# Patient Record
Sex: FEMALE | Race: BLACK OR AFRICAN AMERICAN | NOT HISPANIC OR LATINO
[De-identification: names, ages, dates, MRNs, and addresses within clinical notes are randomized per-mention and may not be internally consistent; named-entity substitution may affect disease eponyms.]

---

## 2021-10-25 PROBLEM — Z00.00 ENCOUNTER FOR PREVENTIVE HEALTH EXAMINATION: Status: ACTIVE | Noted: 2021-10-25

## 2021-11-08 ENCOUNTER — APPOINTMENT (OUTPATIENT)
Dept: OBGYN | Facility: CLINIC | Age: 36
End: 2021-11-08
Payer: COMMERCIAL

## 2021-11-08 VITALS
HEIGHT: 62 IN | DIASTOLIC BLOOD PRESSURE: 70 MMHG | SYSTOLIC BLOOD PRESSURE: 120 MMHG | BODY MASS INDEX: 42.33 KG/M2 | WEIGHT: 230 LBS

## 2021-11-08 DIAGNOSIS — E28.2 POLYCYSTIC OVARIAN SYNDROME: ICD-10-CM

## 2021-11-08 DIAGNOSIS — D25.9 LEIOMYOMA OF UTERUS, UNSPECIFIED: ICD-10-CM

## 2021-11-08 PROCEDURE — 99203 OFFICE O/P NEW LOW 30 MIN: CPT

## 2021-11-15 ENCOUNTER — TRANSCRIPTION ENCOUNTER (OUTPATIENT)
Age: 36
End: 2021-11-15

## 2021-11-24 ENCOUNTER — TRANSCRIPTION ENCOUNTER (OUTPATIENT)
Age: 36
End: 2021-11-24

## 2021-11-24 PROBLEM — D25.9 FIBROID UTERUS: Status: ACTIVE | Noted: 2021-11-24

## 2021-11-24 LAB
17OHP SERPL-MCNC: 44 NG/DL
DHEA SERPL-MCNC: 233 NG/DL
DHEA-S SERPL-MCNC: 152 UG/DL
ESTIMATED AVERAGE GLUCOSE: 108 MG/DL
HBA1C MFR BLD HPLC: 5.4 %
PROLACTIN SERPL-MCNC: 13.7 NG/ML
TESTOST BND SERPL-MCNC: 2.3 PG/ML
TESTOSTERONE TOTAL S: 22 NG/DL
TSH SERPL-ACNC: 3.66 UIU/ML

## 2021-11-24 NOTE — DISCUSSION/SUMMARY
[FreeTextEntry1] : Octavia is a 36 year old F here today for surgical consultation.\par -Pt will obtain MRI done at [facility].\par -Discussed oocyte preservation\par -PCOS blood work done today\par -Message sent to surgical scheduling for abdominal myomectomy

## 2021-11-24 NOTE — HISTORY OF PRESENT ILLNESS
[N] : Patient does not use contraception [Y] : Patient is sexually active [Menarche Age: ____] : age at menarche was [unfilled] [No] : Patient does not have concerns regarding sex [LMPDate] : 10/26/2021 [MensesFreq] : 5-6 [FreeTextEntry1] : 10/26/2021

## 2021-12-09 ENCOUNTER — APPOINTMENT (OUTPATIENT)
Dept: OBGYN | Facility: CLINIC | Age: 36
End: 2021-12-09
Payer: COMMERCIAL

## 2021-12-09 VITALS
SYSTOLIC BLOOD PRESSURE: 150 MMHG | BODY MASS INDEX: 42.33 KG/M2 | WEIGHT: 230 LBS | HEIGHT: 62 IN | DIASTOLIC BLOOD PRESSURE: 98 MMHG

## 2021-12-09 PROCEDURE — 99213 OFFICE O/P EST LOW 20 MIN: CPT

## 2021-12-15 NOTE — END OF VISIT
[FreeTextEntry3] : I, Tereso Christy, acted solely as a scribe for Dr. Abbey Downey on 12/09/2021.\par \par All medical record entries made by the scribe were at my, Dr. Abbey Downey, direction and personally dictated by me on 12/09/2021. I have reviewed the chart and agree that the record accurately reflects my personal performance of the history, physical exam, assessment and plan. I have also personally directed, reviewed, and agreed with the chart.

## 2021-12-15 NOTE — PLAN
[FreeTextEntry1] : reviewed prior labs sent for pcos\par unable to open mri disk, to upload to PACS system\par will f/u with pt next week

## 2021-12-15 NOTE — HISTORY OF PRESENT ILLNESS
[FreeTextEntry1] : POOL KOEHLER 35 YO LMP 2021  presents for follow up for uterine fibroids, and to review recent MRI scan and blood test results. Patient is doing well. \par \par mri disc to PACS viewer\par \par 139/89 BP on rpt

## 2022-01-18 ENCOUNTER — NON-APPOINTMENT (OUTPATIENT)
Age: 37
End: 2022-01-18

## 2022-01-24 ENCOUNTER — OUTPATIENT (OUTPATIENT)
Dept: OUTPATIENT SERVICES | Facility: HOSPITAL | Age: 37
LOS: 1 days | End: 2022-01-24
Payer: COMMERCIAL

## 2022-01-24 VITALS
WEIGHT: 231.04 LBS | HEIGHT: 62 IN | HEART RATE: 86 BPM | RESPIRATION RATE: 18 BRPM | SYSTOLIC BLOOD PRESSURE: 135 MMHG | OXYGEN SATURATION: 99 % | DIASTOLIC BLOOD PRESSURE: 86 MMHG | TEMPERATURE: 98 F

## 2022-01-24 DIAGNOSIS — Z29.9 ENCOUNTER FOR PROPHYLACTIC MEASURES, UNSPECIFIED: ICD-10-CM

## 2022-01-24 DIAGNOSIS — Z01.818 ENCOUNTER FOR OTHER PREPROCEDURAL EXAMINATION: ICD-10-CM

## 2022-01-24 DIAGNOSIS — Z98.890 OTHER SPECIFIED POSTPROCEDURAL STATES: Chronic | ICD-10-CM

## 2022-01-24 DIAGNOSIS — D25.9 LEIOMYOMA OF UTERUS, UNSPECIFIED: ICD-10-CM

## 2022-01-24 LAB
ANION GAP SERPL CALC-SCNC: 14 MMOL/L — SIGNIFICANT CHANGE UP (ref 5–17)
BLD GP AB SCN SERPL QL: NEGATIVE — SIGNIFICANT CHANGE UP
BUN SERPL-MCNC: 11 MG/DL — SIGNIFICANT CHANGE UP (ref 7–23)
CALCIUM SERPL-MCNC: 9.7 MG/DL — SIGNIFICANT CHANGE UP (ref 8.4–10.5)
CHLORIDE SERPL-SCNC: 99 MMOL/L — SIGNIFICANT CHANGE UP (ref 96–108)
CO2 SERPL-SCNC: 26 MMOL/L — SIGNIFICANT CHANGE UP (ref 22–31)
CREAT SERPL-MCNC: 0.87 MG/DL — SIGNIFICANT CHANGE UP (ref 0.5–1.3)
GLUCOSE SERPL-MCNC: 113 MG/DL — HIGH (ref 70–99)
HCT VFR BLD CALC: 37 % — SIGNIFICANT CHANGE UP (ref 34.5–45)
HGB BLD-MCNC: 11.9 G/DL — SIGNIFICANT CHANGE UP (ref 11.5–15.5)
MCHC RBC-ENTMCNC: 27.8 PG — SIGNIFICANT CHANGE UP (ref 27–34)
MCHC RBC-ENTMCNC: 32.2 GM/DL — SIGNIFICANT CHANGE UP (ref 32–36)
MCV RBC AUTO: 86.4 FL — SIGNIFICANT CHANGE UP (ref 80–100)
NRBC # BLD: 0 /100 WBCS — SIGNIFICANT CHANGE UP (ref 0–0)
PLATELET # BLD AUTO: 362 K/UL — SIGNIFICANT CHANGE UP (ref 150–400)
POTASSIUM SERPL-MCNC: 3.5 MMOL/L — SIGNIFICANT CHANGE UP (ref 3.5–5.3)
POTASSIUM SERPL-SCNC: 3.5 MMOL/L — SIGNIFICANT CHANGE UP (ref 3.5–5.3)
RBC # BLD: 4.28 M/UL — SIGNIFICANT CHANGE UP (ref 3.8–5.2)
RBC # FLD: 16.7 % — HIGH (ref 10.3–14.5)
RH IG SCN BLD-IMP: POSITIVE — SIGNIFICANT CHANGE UP
SODIUM SERPL-SCNC: 139 MMOL/L — SIGNIFICANT CHANGE UP (ref 135–145)
WBC # BLD: 6.05 K/UL — SIGNIFICANT CHANGE UP (ref 3.8–10.5)
WBC # FLD AUTO: 6.05 K/UL — SIGNIFICANT CHANGE UP (ref 3.8–10.5)

## 2022-01-24 PROCEDURE — 85027 COMPLETE CBC AUTOMATED: CPT

## 2022-01-24 PROCEDURE — 80048 BASIC METABOLIC PNL TOTAL CA: CPT

## 2022-01-24 PROCEDURE — 86850 RBC ANTIBODY SCREEN: CPT

## 2022-01-24 PROCEDURE — G0463: CPT

## 2022-01-24 PROCEDURE — 86901 BLOOD TYPING SEROLOGIC RH(D): CPT

## 2022-01-24 PROCEDURE — 86900 BLOOD TYPING SEROLOGIC ABO: CPT

## 2022-01-24 RX ORDER — CEFAZOLIN SODIUM 1 G
2000 VIAL (EA) INJECTION ONCE
Refills: 0 | Status: DISCONTINUED | OUTPATIENT
Start: 2022-02-07 | End: 2022-02-08

## 2022-01-24 NOTE — H&P PST ADULT - NSICDXFAMILYHX_GEN_ALL_CORE_FT
FAMILY HISTORY:  Father  Still living? Yes, Estimated age: Age Unknown  Family hx of hypertension, Age at diagnosis: Age Unknown    Mother  Still living? Yes, Estimated age: Age Unknown  Family history of epilepsy in mother, Age at diagnosis: Age Unknown  FH: type 2 diabetes, Age at diagnosis: Age Unknown

## 2022-01-24 NOTE — H&P PST ADULT - ASSESSMENT
CAPRINI SCORE [CLOT updated 18]    AGE RELATED RISK FACTORS                                                       MOBILITY RELATED FACTORS  [ ] Age 41-60 years                                            (1 Point)                    [ ] Bed rest                                                        (1 Point)  [ ] Age: 61-74 years                                           (2 Points)                  [ ] Plaster cast                                                   (2 Points)  [ ] Age= 75 years                                              (3 Points)                    [ ] Bed bound for more than 72 hours                 (2 Points)    DISEASE RELATED RISK FACTORS                                               GENDER SPECIFIC FACTORS  [ ] Edema in the lower extremities                       (1 Point)              [ ] Pregnancy                                                     (1 Point)  [ ] Varicose veins                                               (1 Point)                     [ ] Post-partum < 6 weeks                                   (1 Point)             [ ] BMI > 25 Kg/m2                                            (1 Point)                     [ ] Hormonal therapy  or oral contraception          (1 Point)                 [ ] Sepsis (in the previous month)                        (1 Point)               [ ] History of pregnancy complications                 (1 point)  [ ] Pneumonia or serious lung disease                                               [ ] Unexplained or recurrent                     (1 Point)           (in the previous month)                               (1 Point)  [ ] Abnormal pulmonary function test                     (1 Point)                 SURGERY RELATED RISK FACTORS  [ ] Acute myocardial infarction                              (1 Point)               [ ]  Section                                             (1 Point)  [ ] Congestive heart failure (in the previous month)  (1 Point)      [ ] Minor surgery                                                  (1 Point)   [ ] Inflammatory bowel disease                             (1 Point)               [ ] Arthroscopic surgery                                        (2 Points)  [ ] Central venous access                                      (2 Points)                [ ] General surgery lasting more than 45 minutes (2 points)  [ ] Present or previous malignancy                     (2 Points)                [ ] Elective arthroplasty                                         (5 points)    [ ] Stroke (in the previous month)                          (5 Points)                                                                                                                                                           HEMATOLOGY RELATED FACTORS                                                 TRAUMA RELATED RISK FACTORS  [ ] Prior episodes of VTE                                     (3 Points)                [ ] Fracture of the hip, pelvis, or leg                       (5 Points)  [ ] Positive family history for VTE                         (3 Points)             [ ] Acute spinal cord injury (in the previous month)  (5 Points)  [ ] Prothrombin 15426 A                                     (3 Points)               [ ] Paralysis  (less than 1 month)                             (5 Points)  [ ] Factor V Leiden                                             (3 Points)                  [ ] Multiple Trauma within 1 month                        (5 Points)  [ ] Lupus anticoagulants                                     (3 Points)                                                           [ ] Anticardiolipin antibodies                               (3 Points)                                                       [ ] High homocysteine in the blood                      (3 Points)                                             [ ] Other congenital or acquired thrombophilia      (3 Points)                                                [ ] Heparin induced thrombocytopenia                  (3 Points)                                     Total Score [ 3  ]

## 2022-01-24 NOTE — H&P PST ADULT - NEUROLOGICAL
[Patient] : patient negative Alert & oriented; no sensory, motor or coordination deficits, normal reflexes

## 2022-01-24 NOTE — H&P PST ADULT - HISTORY OF PRESENT ILLNESS
36yr old female presents to PST w/ c/o menorrhagia and is scheduled for an Abdominal Myomectomy ERP on 2/7/22.  PMH: HTN and Obesity (BMI 42.3). Denies recent fevers, chills, cough, chest pain or SOB and feels well otherwise. COVID testing scheduled at [facility] on 2/4/22.

## 2022-01-24 NOTE — H&P PST ADULT - PROBLEM SELECTOR PLAN 1
Scheduled for sx on 2/7/2022. Surgical and chlorhexidine instructions reviewed w/ pt. COVID testing scheduled at [facility] on 2/4/2022.

## 2022-01-24 NOTE — H&P PST ADULT - NSICDXPASTMEDICALHX_GEN_ALL_CORE_FT
PAST MEDICAL HISTORY:  Hypertension     Leiomyoma of uterus     Severe obesity (BMI >= 40) BMI 42.3

## 2022-01-24 NOTE — H&P PST ADULT - ATTENDING COMMENTS
pt seen and plan discussed. to proceed with abdominal myomectomy. all questions answered. informed consent signed and witnessed.    anuel martínez

## 2022-01-27 PROBLEM — E66.01 MORBID (SEVERE) OBESITY DUE TO EXCESS CALORIES: Chronic | Status: ACTIVE | Noted: 2022-01-24

## 2022-01-27 PROBLEM — I10 ESSENTIAL (PRIMARY) HYPERTENSION: Chronic | Status: ACTIVE | Noted: 2022-01-24

## 2022-01-27 PROBLEM — D25.9 LEIOMYOMA OF UTERUS, UNSPECIFIED: Chronic | Status: ACTIVE | Noted: 2022-01-24

## 2022-02-03 ENCOUNTER — NON-APPOINTMENT (OUTPATIENT)
Age: 37
End: 2022-02-03

## 2022-02-04 ENCOUNTER — OUTPATIENT (OUTPATIENT)
Dept: OUTPATIENT SERVICES | Facility: HOSPITAL | Age: 37
LOS: 1 days | End: 2022-02-04
Payer: COMMERCIAL

## 2022-02-04 DIAGNOSIS — Z98.890 OTHER SPECIFIED POSTPROCEDURAL STATES: Chronic | ICD-10-CM

## 2022-02-04 DIAGNOSIS — Z11.52 ENCOUNTER FOR SCREENING FOR COVID-19: ICD-10-CM

## 2022-02-04 LAB — SARS-COV-2 RNA SPEC QL NAA+PROBE: SIGNIFICANT CHANGE UP

## 2022-02-04 PROCEDURE — U0003: CPT

## 2022-02-04 PROCEDURE — U0005: CPT

## 2022-02-04 PROCEDURE — C9803: CPT

## 2022-02-06 ENCOUNTER — TRANSCRIPTION ENCOUNTER (OUTPATIENT)
Age: 37
End: 2022-02-06

## 2022-02-07 ENCOUNTER — INPATIENT (INPATIENT)
Facility: HOSPITAL | Age: 37
LOS: 0 days | Discharge: HOME | DRG: 742 | End: 2022-02-08
Attending: STUDENT IN AN ORGANIZED HEALTH CARE EDUCATION/TRAINING PROGRAM | Admitting: STUDENT IN AN ORGANIZED HEALTH CARE EDUCATION/TRAINING PROGRAM
Payer: COMMERCIAL

## 2022-02-07 ENCOUNTER — APPOINTMENT (OUTPATIENT)
Dept: OBGYN | Facility: HOSPITAL | Age: 37
End: 2022-02-07

## 2022-02-07 ENCOUNTER — TRANSCRIPTION ENCOUNTER (OUTPATIENT)
Age: 37
End: 2022-02-07

## 2022-02-07 VITALS
SYSTOLIC BLOOD PRESSURE: 139 MMHG | DIASTOLIC BLOOD PRESSURE: 88 MMHG | RESPIRATION RATE: 18 BRPM | OXYGEN SATURATION: 99 % | HEIGHT: 62.01 IN | WEIGHT: 231.04 LBS | TEMPERATURE: 99 F | HEART RATE: 96 BPM

## 2022-02-07 DIAGNOSIS — D25.9 LEIOMYOMA OF UTERUS, UNSPECIFIED: ICD-10-CM

## 2022-02-07 DIAGNOSIS — Z98.890 OTHER SPECIFIED POSTPROCEDURAL STATES: Chronic | ICD-10-CM

## 2022-02-07 LAB
HCG UR QL: NEGATIVE — SIGNIFICANT CHANGE UP
RH IG SCN BLD-IMP: POSITIVE — SIGNIFICANT CHANGE UP

## 2022-02-07 PROCEDURE — 88305 TISSUE EXAM BY PATHOLOGIST: CPT | Mod: 26

## 2022-02-07 PROCEDURE — 58140 MYOMECTOMY ABDOM METHOD: CPT | Mod: 80

## 2022-02-07 PROCEDURE — 58140 MYOMECTOMY ABDOM METHOD: CPT

## 2022-02-07 DEVICE — VISTASEAL FIBRIN HUMAN 4ML: Type: IMPLANTABLE DEVICE | Status: FUNCTIONAL

## 2022-02-07 DEVICE — INTERCEED 5 X 6" XL: Type: IMPLANTABLE DEVICE | Status: FUNCTIONAL

## 2022-02-07 RX ORDER — HYDROMORPHONE HYDROCHLORIDE 2 MG/ML
0.5 INJECTION INTRAMUSCULAR; INTRAVENOUS; SUBCUTANEOUS
Refills: 0 | Status: DISCONTINUED | OUTPATIENT
Start: 2022-02-07 | End: 2022-02-07

## 2022-02-07 RX ORDER — SODIUM CHLORIDE 9 MG/ML
3 INJECTION INTRAMUSCULAR; INTRAVENOUS; SUBCUTANEOUS EVERY 8 HOURS
Refills: 0 | Status: DISCONTINUED | OUTPATIENT
Start: 2022-02-07 | End: 2022-02-07

## 2022-02-07 RX ORDER — ONDANSETRON 8 MG/1
4 TABLET, FILM COATED ORAL EVERY 6 HOURS
Refills: 0 | Status: DISCONTINUED | OUTPATIENT
Start: 2022-02-07 | End: 2022-02-08

## 2022-02-07 RX ORDER — ACETAMINOPHEN 500 MG
1000 TABLET ORAL ONCE
Refills: 0 | Status: DISCONTINUED | OUTPATIENT
Start: 2022-02-08 | End: 2022-02-08

## 2022-02-07 RX ORDER — ACETAMINOPHEN 500 MG
1000 TABLET ORAL ONCE
Refills: 0 | Status: COMPLETED | OUTPATIENT
Start: 2022-02-08 | End: 2022-02-07

## 2022-02-07 RX ORDER — NORETHINDRONE 0.35 MG/1
1 TABLET ORAL
Qty: 0 | Refills: 0 | DISCHARGE

## 2022-02-07 RX ORDER — ACETAMINOPHEN 500 MG
1000 TABLET ORAL ONCE
Refills: 0 | Status: COMPLETED | OUTPATIENT
Start: 2022-02-07 | End: 2022-02-07

## 2022-02-07 RX ORDER — GABAPENTIN 400 MG/1
600 CAPSULE ORAL ONCE
Refills: 0 | Status: COMPLETED | OUTPATIENT
Start: 2022-02-07 | End: 2022-02-07

## 2022-02-07 RX ORDER — HYDROCHLOROTHIAZIDE 25 MG
25 TABLET ORAL DAILY
Refills: 0 | Status: DISCONTINUED | OUTPATIENT
Start: 2022-02-08 | End: 2022-02-08

## 2022-02-07 RX ORDER — SIMETHICONE 80 MG/1
80 TABLET, CHEWABLE ORAL EVERY 6 HOURS
Refills: 0 | Status: DISCONTINUED | OUTPATIENT
Start: 2022-02-07 | End: 2022-02-08

## 2022-02-07 RX ORDER — OXYCODONE HYDROCHLORIDE 5 MG/1
5 TABLET ORAL
Refills: 0 | Status: DISCONTINUED | OUTPATIENT
Start: 2022-02-07 | End: 2022-02-08

## 2022-02-07 RX ORDER — CELECOXIB 200 MG/1
400 CAPSULE ORAL ONCE
Refills: 0 | Status: COMPLETED | OUTPATIENT
Start: 2022-02-07 | End: 2022-02-07

## 2022-02-07 RX ORDER — KETOROLAC TROMETHAMINE 30 MG/ML
30 SYRINGE (ML) INJECTION EVERY 8 HOURS
Refills: 0 | Status: DISCONTINUED | OUTPATIENT
Start: 2022-02-07 | End: 2022-02-08

## 2022-02-07 RX ORDER — SODIUM CHLORIDE 9 MG/ML
1000 INJECTION, SOLUTION INTRAVENOUS
Refills: 0 | Status: DISCONTINUED | OUTPATIENT
Start: 2022-02-07 | End: 2022-02-08

## 2022-02-07 RX ORDER — LIDOCAINE HCL 20 MG/ML
0.2 VIAL (ML) INJECTION ONCE
Refills: 0 | Status: DISCONTINUED | OUTPATIENT
Start: 2022-02-07 | End: 2022-02-07

## 2022-02-07 RX ORDER — ONDANSETRON 8 MG/1
4 TABLET, FILM COATED ORAL ONCE
Refills: 0 | Status: DISCONTINUED | OUTPATIENT
Start: 2022-02-07 | End: 2022-02-07

## 2022-02-07 RX ORDER — SENNA PLUS 8.6 MG/1
1 TABLET ORAL AT BEDTIME
Refills: 0 | Status: DISCONTINUED | OUTPATIENT
Start: 2022-02-07 | End: 2022-02-08

## 2022-02-07 RX ORDER — HEPARIN SODIUM 5000 [USP'U]/ML
5000 INJECTION INTRAVENOUS; SUBCUTANEOUS EVERY 12 HOURS
Refills: 0 | Status: DISCONTINUED | OUTPATIENT
Start: 2022-02-07 | End: 2022-02-08

## 2022-02-07 RX ORDER — CHLORHEXIDINE GLUCONATE 213 G/1000ML
1 SOLUTION TOPICAL ONCE
Refills: 0 | Status: DISCONTINUED | OUTPATIENT
Start: 2022-02-07 | End: 2022-02-07

## 2022-02-07 RX ORDER — ONDANSETRON 8 MG/1
8 TABLET, FILM COATED ORAL EVERY 8 HOURS
Refills: 0 | Status: DISCONTINUED | OUTPATIENT
Start: 2022-02-07 | End: 2022-02-07

## 2022-02-07 RX ORDER — ACETAMINOPHEN 500 MG
1000 TABLET ORAL ONCE
Refills: 0 | Status: COMPLETED | OUTPATIENT
Start: 2022-02-07 | End: 2022-02-08

## 2022-02-07 RX ADMIN — Medication 400 MILLIGRAM(S): at 23:18

## 2022-02-07 RX ADMIN — SODIUM CHLORIDE 125 MILLILITER(S): 9 INJECTION, SOLUTION INTRAVENOUS at 17:52

## 2022-02-07 RX ADMIN — Medication 400 MILLIGRAM(S): at 17:52

## 2022-02-07 RX ADMIN — Medication 30 MILLIGRAM(S): at 22:40

## 2022-02-07 RX ADMIN — Medication 1000 MILLIGRAM(S): at 06:02

## 2022-02-07 RX ADMIN — HEPARIN SODIUM 5000 UNIT(S): 5000 INJECTION INTRAVENOUS; SUBCUTANEOUS at 16:14

## 2022-02-07 RX ADMIN — Medication 1000 MILLIGRAM(S): at 18:07

## 2022-02-07 RX ADMIN — Medication 1000 MILLIGRAM(S): at 12:15

## 2022-02-07 RX ADMIN — Medication 400 MILLIGRAM(S): at 11:48

## 2022-02-07 RX ADMIN — Medication 30 MILLIGRAM(S): at 22:10

## 2022-02-07 RX ADMIN — SODIUM CHLORIDE 125 MILLILITER(S): 9 INJECTION, SOLUTION INTRAVENOUS at 11:32

## 2022-02-07 RX ADMIN — SIMETHICONE 80 MILLIGRAM(S): 80 TABLET, CHEWABLE ORAL at 22:10

## 2022-02-07 RX ADMIN — Medication 30 MILLIGRAM(S): at 13:11

## 2022-02-07 RX ADMIN — GABAPENTIN 600 MILLIGRAM(S): 400 CAPSULE ORAL at 06:02

## 2022-02-07 RX ADMIN — Medication 1000 MILLIGRAM(S): at 23:48

## 2022-02-07 RX ADMIN — CELECOXIB 400 MILLIGRAM(S): 200 CAPSULE ORAL at 06:01

## 2022-02-07 NOTE — BRIEF OPERATIVE NOTE - OPERATION/FINDINGS
Small ~5-6 week size uterus ~10cm FIGO stage 4 myoma along posterior aspect of uterus. Smaller FIGO stage 4 myomas removed from same incision. 2 <1cm FIGO stage 6 myoma. Normal appearing bilateral fallopian tubes and ovaries. Normal appearing uterus. Vistaseal and Interceed placed over uterus Small ~8 week size uterus ~10cm FIGO stage 4 myoma along posterior aspect of uterus. Smaller FIGO stage 4 myomas removed from same incision. 2 <1cm FIGO stage 6 myoma. Normal appearing bilateral fallopian tubes and ovaries. Normal appearing uterus. Vistaseal and Interceed placed over uterus

## 2022-02-07 NOTE — DISCHARGE NOTE PROVIDER - NSDCMRMEDTOKEN_GEN_ALL_CORE_FT
hydroCHLOROthiazide 25 mg oral tablet: 1 tab(s) orally once a day  norethindrone 5 mg oral tablet: 1 tab(s) orally once a day   hydroCHLOROthiazide 25 mg oral tablet: 1 tab(s) orally once a day  norethindrone 5 mg oral tablet: 1 tab(s) orally once a day  oxyCODONE 5 mg oral tablet: 1 tab(s) orally every 3 hours, As needed, Moderate Pain (4 - 6) MDD:4 tablets   acetaminophen 325 mg oral tablet: 3 tab(s) orally every 6 hours, As needed, Mild Pain (1 - 3)  hydroCHLOROthiazide 25 mg oral tablet: 1 tab(s) orally once a day  ibuprofen 600 mg oral tablet: 1 tab(s) orally every 6 hours, As needed, Moderate Pain (4 - 6)/ cramping  norethindrone 5 mg oral tablet: 1 tab(s) orally once a day  oxyCODONE 5 mg oral tablet: 1 tab(s) orally every 3 hours, As needed, Moderate Pain (4 - 6) MDD:4 tablets

## 2022-02-07 NOTE — DISCHARGE NOTE PROVIDER - HOSPITAL COURSE
Patient underwent an uncomplicated abdominal myomectomy for fibroid uterus. EBL: 300cc. Hct:   .    POD#0 Pain was well controlled with PCA pump and patient tolerated clears.   POD#1 No acute events overnight. Patient was advanced to a regular diet. [place] was discontinued and patient voided spontaneously. Patient was transitioned to oral analgesics and pain was well controlled.   Upon discharge on POD# , the patient is ambulating, voiding spontaneously, tolerating oral intake, pain was well controlled with oral medication, and vital signs were stable.   Patient to have close follow up with _. Patient underwent an uncomplicated abdominal myomectomy for fibroid uterus. EBL: 300cc. POD#1 Hct:   .    POD#0 Pain was well controlled with PCA pump and patient tolerated clears. [place] was discontinued.  POD#1 No acute events overnight. Patient was advanced to a regular diet. Patient voided spontaneously. Patient was transitioned to oral analgesics and pain was well controlled.   Upon discharge on POD# , the patient is ambulating, voiding spontaneously, tolerating oral intake, pain was well controlled with oral medication, and vital signs were stable.   Patient to have close follow up with _. Patient underwent an uncomplicated abdominal myomectomy for fibroid uterus. EBL: 300cc. POD#1 H/H 10.6/33.6 from 11.9/37.    POD#0 Pain was well controlled with PCA pump and patient tolerated clears. Boston was discontinued.  POD#1 No acute events overnight. Patient was advanced to a regular diet. Patient voided spontaneously. Patient was transitioned to oral analgesics and pain was well controlled.   Upon discharge on POD# , the patient is ambulating, voiding spontaneously, tolerating oral intake, pain was well controlled with oral medication, and vital signs were stable.   Patient to have close follow up with Dr Downey. Patient underwent an uncomplicated abdominal myomectomy for fibroid uterus. EBL: 300cc. POD#1 H/H 10.6/33.6 from 11.9/37.    POD#0 Pain was well controlled with PCA pump and patient tolerated clears. Boston was discontinued.  POD#1 No acute events overnight. Patient was advanced to a regular diet. Patient voided spontaneously. Patient was transitioned to oral analgesics and pain was well controlled.   Upon discharge on POD#1, the patient is ambulating, voiding spontaneously, tolerating oral intake, pain was well controlled with oral medication, and vital signs were stable.   Patient to have close follow up with Dr Downey.

## 2022-02-07 NOTE — DISCHARGE NOTE PROVIDER - NSDCACTIVITY_GEN_ALL_CORE
Return to Work/School allowed/Walking - Indoors allowed/No heavy lifting/straining/Walking - Outdoors allowed/Follow Instructions Provided by your Surgical Team

## 2022-02-07 NOTE — BRIEF OPERATIVE NOTE - COMMENTS
363-ILBY. Lena Infante, Palliative  Dictation by Dr. Jean

## 2022-02-07 NOTE — DISCHARGE NOTE PROVIDER - CARE PROVIDER_API CALL
Abbey Downey)  Obstetrics and Gynecology  [address] [place] [address]  Phone: [phone]  Fax: [phone]  Follow Up Time:    Abbey Downey)  Obstetrics and Gynecology  [address] [place] [address]  Phone: [phone]  Fax: [phone]  Scheduled Appointment: 02/24/2022 05:45 PM

## 2022-02-07 NOTE — DISCHARGE NOTE PROVIDER - NSDCCPTREATMENT_GEN_ALL_CORE_FT
PRINCIPAL PROCEDURE  Procedure: Myomectomy, uterus, 5 or more leiomyomas, abdominal approach  Findings and Treatment:

## 2022-02-07 NOTE — DISCHARGE NOTE PROVIDER - PROVIDER TOKENS
PROVIDER:[TOKEN:[[id number]]] PROVIDER:[TOKEN:[[id number]],SCHEDULEDAPPT:[02/24/2022],SCHEDULEDAPPTTIME:[05:45 PM]]

## 2022-02-07 NOTE — CHART NOTE - NSCHARTNOTEFT_GEN_A_CORE
R1 Gyn Post Op Check    Patient seen and examined at bedside. No acute complaints. Pain well controlled.  Patient tolerating ice chips, has not tried clears. Has not yet passed flatus. Bailey in place without discomfort. Denies CP, SOB, N/V, fevers, and chills.    Vital Signs Last 24 Hours  T(C): 36.1 (02-07-22 @ 10:35), Max: 37 (02-07-22 @ 06:03)  HR: 97 (02-07-22 @ 12:15) (91 - 121)  BP: 136/74 (02-07-22 @ 12:15) (120/61 - 139/88)  RR: 17 (02-07-22 @ 12:15) (16 - 18)  SpO2: 96% (02-07-22 @ 12:15) (96% - 100%)    I&O's Summary    07 Feb 2022 07:01  -  07 Feb 2022 13:00  --------------------------------------------------------  IN: 125 mL / OUT: 300 mL / NET: -175 mL        Physical Exam:  General: NAD  Cardio: regular rate and rhythm, no murmurs, rubs, or gallops  Lungs: clear to auscultation bilaterally  Abdomen: Soft, non-distended, appropriately tender  Incision: overlaying bandage clean, dry, intact  Ext: No pain or swelling    Labs:      MEDICATIONS  (STANDING):  acetaminophen   IVPB .. 1000 milliGRAM(s) IV Intermittent once  acetaminophen   IVPB .. 1000 milliGRAM(s) IV Intermittent once  ceFAZolin   IVPB 2000 milliGRAM(s) IV Intermittent once  heparin   Injectable 5000 Unit(s) SubCutaneous every 12 hours  ketorolac   Injectable 30 milliGRAM(s) IV Push every 8 hours  lactated ringers. 1000 milliLiter(s) (125 mL/Hr) IV Continuous <Continuous>    MEDICATIONS  (PRN):  HYDROmorphone  Injectable 0.5 milliGRAM(s) IV Push every 10 minutes PRN Severe Pain (7 - 10)  ondansetron Injectable 4 milliGRAM(s) IV Push every 6 hours PRN Nausea and/or Vomiting  ondansetron Injectable 4 milliGRAM(s) IV Push once PRN Nausea and/or Vomiting  oxyCODONE    IR 5 milliGRAM(s) Oral every 3 hours PRN Moderate Pain (4 - 6)  senna 1 Tablet(s) Oral at bedtime PRN Constipation  simethicone 80 milliGRAM(s) Chew every 6 hours PRN Gas      Neuro: PCA, Tylenol for pain control  CV: hemodynamically stable  Pulm: saturating well on room air, encourage incentive spirometry  GI: encourage clears, advance to regular diet when tolerating  : UOP adequate, bailey draining clear urine; d/c bailey at 4pm  Heme: continue HSQ and SCDs for DVT prophylaxis  Dispo: floor once cleared by anesthesia    d/w Gyn team  Becca Blackman  PGY-1

## 2022-02-07 NOTE — BRIEF OPERATIVE NOTE - NSICDXBRIEFPROCEDURE_GEN_ALL_CORE_FT
PROCEDURES:  Myomectomy, uterus, 5 or more leiomyomas, abdominal approach 07-Feb-2022 10:16:35  Kellie Lewis

## 2022-02-07 NOTE — DISCHARGE NOTE PROVIDER - CARE PROVIDERS DIRECT ADDRESSES
[email or web address]

## 2022-02-08 ENCOUNTER — TRANSCRIPTION ENCOUNTER (OUTPATIENT)
Age: 37
End: 2022-02-08

## 2022-02-08 VITALS
SYSTOLIC BLOOD PRESSURE: 106 MMHG | TEMPERATURE: 98 F | OXYGEN SATURATION: 96 % | DIASTOLIC BLOOD PRESSURE: 71 MMHG | HEART RATE: 70 BPM | RESPIRATION RATE: 18 BRPM

## 2022-02-08 LAB
BASOPHILS # BLD AUTO: 0.01 K/UL — SIGNIFICANT CHANGE UP (ref 0–0.2)
BASOPHILS NFR BLD AUTO: 0.1 % — SIGNIFICANT CHANGE UP (ref 0–2)
EOSINOPHIL # BLD AUTO: 0 K/UL — SIGNIFICANT CHANGE UP (ref 0–0.5)
EOSINOPHIL NFR BLD AUTO: 0 % — SIGNIFICANT CHANGE UP (ref 0–6)
HCT VFR BLD CALC: 33.6 % — LOW (ref 34.5–45)
HGB BLD-MCNC: 10.6 G/DL — LOW (ref 11.5–15.5)
IMM GRANULOCYTES NFR BLD AUTO: 0.7 % — SIGNIFICANT CHANGE UP (ref 0–1.5)
LYMPHOCYTES # BLD AUTO: 1.16 K/UL — SIGNIFICANT CHANGE UP (ref 1–3.3)
LYMPHOCYTES # BLD AUTO: 8.6 % — LOW (ref 13–44)
MCHC RBC-ENTMCNC: 27.9 PG — SIGNIFICANT CHANGE UP (ref 27–34)
MCHC RBC-ENTMCNC: 31.5 GM/DL — LOW (ref 32–36)
MCV RBC AUTO: 88.4 FL — SIGNIFICANT CHANGE UP (ref 80–100)
MONOCYTES # BLD AUTO: 1.1 K/UL — HIGH (ref 0–0.9)
MONOCYTES NFR BLD AUTO: 8.1 % — SIGNIFICANT CHANGE UP (ref 2–14)
NEUTROPHILS # BLD AUTO: 11.16 K/UL — HIGH (ref 1.8–7.4)
NEUTROPHILS NFR BLD AUTO: 82.5 % — HIGH (ref 43–77)
NRBC # BLD: 0 /100 WBCS — SIGNIFICANT CHANGE UP (ref 0–0)
PLATELET # BLD AUTO: 260 K/UL — SIGNIFICANT CHANGE UP (ref 150–400)
RBC # BLD: 3.8 M/UL — SIGNIFICANT CHANGE UP (ref 3.8–5.2)
RBC # FLD: 15.7 % — HIGH (ref 10.3–14.5)
WBC # BLD: 13.52 K/UL — HIGH (ref 3.8–10.5)
WBC # FLD AUTO: 13.52 K/UL — HIGH (ref 3.8–10.5)

## 2022-02-08 PROCEDURE — C9399: CPT

## 2022-02-08 PROCEDURE — 85025 COMPLETE CBC W/AUTO DIFF WBC: CPT

## 2022-02-08 PROCEDURE — C1765: CPT

## 2022-02-08 PROCEDURE — 88305 TISSUE EXAM BY PATHOLOGIST: CPT

## 2022-02-08 PROCEDURE — 81025 URINE PREGNANCY TEST: CPT

## 2022-02-08 PROCEDURE — C1889: CPT

## 2022-02-08 RX ORDER — IBUPROFEN 200 MG
1 TABLET ORAL
Qty: 0 | Refills: 0 | DISCHARGE
Start: 2022-02-08

## 2022-02-08 RX ORDER — IBUPROFEN 200 MG
600 TABLET ORAL EVERY 6 HOURS
Refills: 0 | Status: DISCONTINUED | OUTPATIENT
Start: 2022-02-08 | End: 2022-02-08

## 2022-02-08 RX ORDER — ACETAMINOPHEN 500 MG
3 TABLET ORAL
Qty: 0 | Refills: 0 | DISCHARGE
Start: 2022-02-08

## 2022-02-08 RX ORDER — ACETAMINOPHEN 500 MG
975 TABLET ORAL EVERY 6 HOURS
Refills: 0 | Status: DISCONTINUED | OUTPATIENT
Start: 2022-02-08 | End: 2022-02-08

## 2022-02-08 RX ORDER — SODIUM CHLORIDE 9 MG/ML
3 INJECTION INTRAMUSCULAR; INTRAVENOUS; SUBCUTANEOUS EVERY 8 HOURS
Refills: 0 | Status: DISCONTINUED | OUTPATIENT
Start: 2022-02-08 | End: 2022-02-08

## 2022-02-08 RX ORDER — OXYCODONE HYDROCHLORIDE 5 MG/1
1 TABLET ORAL
Qty: 5 | Refills: 0
Start: 2022-02-08

## 2022-02-08 RX ADMIN — Medication 30 MILLIGRAM(S): at 05:13

## 2022-02-08 RX ADMIN — OXYCODONE HYDROCHLORIDE 5 MILLIGRAM(S): 5 TABLET ORAL at 15:06

## 2022-02-08 RX ADMIN — Medication 975 MILLIGRAM(S): at 15:01

## 2022-02-08 RX ADMIN — HEPARIN SODIUM 5000 UNIT(S): 5000 INJECTION INTRAVENOUS; SUBCUTANEOUS at 05:13

## 2022-02-08 RX ADMIN — Medication 600 MILLIGRAM(S): at 12:58

## 2022-02-08 RX ADMIN — Medication 25 MILLIGRAM(S): at 05:14

## 2022-02-08 RX ADMIN — Medication 600 MILLIGRAM(S): at 13:30

## 2022-02-08 RX ADMIN — Medication 400 MILLIGRAM(S): at 05:12

## 2022-02-08 NOTE — DISCHARGE NOTE NURSING/CASE MANAGEMENT/SOCIAL WORK - NSDCPNINST_GEN_ALL_CORE
Please call MD for any s/s of infection including fever, chills and increased pain. Alert MD for any increase in vaginal bleeding.

## 2022-02-08 NOTE — PROGRESS NOTE ADULT - SUBJECTIVE AND OBJECTIVE BOX
R1 Gyn Progress Note  HD#2     POD#1    Patient seen and examined at bedside. No acute overnight events. No acute complaints, pain well controlled.  Patient is ambulating to bathroom. She is passing flatus. Voiding spontaneously, tolerating regular diet. Denies CP, SOB, N/V, fevers, and chills.    Vital Signs Last 24 Hours  T(C): 36.8 (02-08-22 @ 05:12), Max: 37 (02-07-22 @ 07:18)  HR: 79 (02-08-22 @ 05:12) (79 - 121)  BP: 129/80 (02-08-22 @ 05:12) (113/75 - 139/88)  RR: 18 (02-08-22 @ 05:12) (16 - 19)  SpO2: 98% (02-08-22 @ 05:12) (95% - 100%)    I&O's Summary    07 Feb 2022 07:01  -  08 Feb 2022 06:23  --------------------------------------------------------  IN: 995 mL / OUT: 3800 mL / NET: -2805 mL        Physical Exam:  General: NAD  CV: regular rate and rhythm  Lungs: clear to auscultation bilaterally  Abdomen: Soft, non-tender, non-distended, +BS  Incision: C/D/I with steristrips intact  Ext: No pain or swelling    Labs:      MEDICATIONS  (STANDING):  acetaminophen   IVPB .. 1000 milliGRAM(s) IV Intermittent once  ceFAZolin   IVPB 2000 milliGRAM(s) IV Intermittent once  heparin   Injectable 5000 Unit(s) SubCutaneous every 12 hours  hydrochlorothiazide 25 milliGRAM(s) Oral daily  ketorolac   Injectable 30 milliGRAM(s) IV Push every 8 hours  lactated ringers. 1000 milliLiter(s) (125 mL/Hr) IV Continuous <Continuous>    MEDICATIONS  (PRN):  ondansetron Injectable 4 milliGRAM(s) IV Push every 6 hours PRN Nausea and/or Vomiting  oxyCODONE    IR 5 milliGRAM(s) Oral every 3 hours PRN Moderate Pain (4 - 6)  senna 1 Tablet(s) Oral at bedtime PRN Constipation  simethicone 80 milliGRAM(s) Chew every 6 hours PRN Gas R1 Gyn Progress Note  HD#2     POD#1    Patient seen and examined at bedside. No acute overnight events. No acute complaints, pain well controlled.  Patient is ambulating to bathroom. She is passing flatus. Voiding spontaneously, tolerating regular diet. Denies CP, SOB, N/V, fevers, and chills.    Vital Signs Last 24 Hours  T(C): 36.8 (02-08-22 @ 05:12), Max: 37 (02-07-22 @ 07:18)  HR: 79 (02-08-22 @ 05:12) (79 - 121)  BP: 129/80 (02-08-22 @ 05:12) (113/75 - 139/88)  RR: 18 (02-08-22 @ 05:12) (16 - 19)  SpO2: 98% (02-08-22 @ 05:12) (95% - 100%)    I&O's Summary    07 Feb 2022 07:01  -  08 Feb 2022 06:23  --------------------------------------------------------  IN: 995 mL / OUT: 3800 mL / NET: -2805 mL    Physical Exam:  General: NAD  CV: regular rate and rhythm  Lungs: clear to auscultation bilaterally  Abdomen: Soft, non-tender, non-distended, +BS  Incision: C/D/I with steristrips intact  Ext: No pain or swelling    Labs:             10.6   13.52 )-----------( 260      ( 02-08 @ 06:49 )             33.6                11.9   6.05  )-----------( 362      ( 01-24 @ 09:48 )             37.0       MEDICATIONS  (STANDING):  acetaminophen   IVPB .. 1000 milliGRAM(s) IV Intermittent once  ceFAZolin   IVPB 2000 milliGRAM(s) IV Intermittent once  heparin   Injectable 5000 Unit(s) SubCutaneous every 12 hours  hydrochlorothiazide 25 milliGRAM(s) Oral daily  ketorolac   Injectable 30 milliGRAM(s) IV Push every 8 hours  lactated ringers. 1000 milliLiter(s) (125 mL/Hr) IV Continuous <Continuous>    MEDICATIONS  (PRN):  ondansetron Injectable 4 milliGRAM(s) IV Push every 6 hours PRN Nausea and/or Vomiting  oxyCODONE    IR 5 milliGRAM(s) Oral every 3 hours PRN Moderate Pain (4 - 6)  senna 1 Tablet(s) Oral at bedtime PRN Constipation  simethicone 80 milliGRAM(s) Chew every 6 hours PRN Gas

## 2022-02-08 NOTE — PROGRESS NOTE ADULT - ASSESSMENT
A/P:   36y POD#1 from abdominal myomectomy , recovering appropriately post-operatively.    Neuro: Transition to PO pain medications  CV: Hemodynamically stable, f/u AM CBC  Pulm: Saturating well on room air, encourage oob/amb  GI: Tolerating regular diet  : Voiding spontaneously,  overnight, saline-lock  Heme: c/w HSQ and SCDs for DVT ppx  Dispo: Continue routine post-op care    Becca Blackman  PGY-1 36y POD#1 from abdominal myomectomy , recovering appropriately post-operatively.    Neuro: Transition to PO pain medications  CV: Hemodynamically stable  Pulm: Saturating well on room air, encourage oob/amb  GI: Tolerating regular diet  : Voiding spontaneously,  overnight, saline-lock  Heme: c/w HSQ and SCDs for DVT ppx  Dispo: Continue routine post-op care    Becca Blackman  PGY-1    Patient seen and examined, agree with above. Patient recovering well, meeting all milestones, no complaints, H&H drop this morning appropriate, vitals normal. Continue routine post-op care.    Eduardo Avery, PGY-6

## 2022-02-08 NOTE — DISCHARGE NOTE NURSING/CASE MANAGEMENT/SOCIAL WORK - PATIENT PORTAL LINK FT
You can access the FollowMyHealth Patient Portal offered by [facility] by registering at the following website: [email or web address] By joining [facility]’s FollowMyHealth portal, you will also be able to view your health information using other applications (apps) compatible with our system.

## 2022-02-08 NOTE — PROGRESS NOTE ADULT - ATTENDING COMMENTS
pt seen and examined. doing well this am. agree with above. routine postop care. meeting postop milestones. will eval for dc home today.    anuel martínez

## 2022-02-08 NOTE — DISCHARGE NOTE NURSING/CASE MANAGEMENT/SOCIAL WORK - NSPROEXTENSIONSOFSELF_GEN_A_NUR
Spoke with Shreya at [facility] and informed her that the only anti-coagulant medication the patient is on in Aspirin 325mg daily. none

## 2022-02-08 NOTE — DISCHARGE NOTE NURSING/CASE MANAGEMENT/SOCIAL WORK - NSDCPEFALRISK_GEN_ALL_CORE
For information on Fall & Injury Prevention, visit: [email or web address] OR  [email or web address] OR  https://www.cdc.gov/steadi/patient.html

## 2022-02-09 ENCOUNTER — RX CHANGE (OUTPATIENT)
Age: 37
End: 2022-02-09

## 2022-02-10 ENCOUNTER — NON-APPOINTMENT (OUTPATIENT)
Age: 37
End: 2022-02-10

## 2022-02-14 LAB — SURGICAL PATHOLOGY STUDY: SIGNIFICANT CHANGE UP

## 2022-02-15 ENCOUNTER — RX CHANGE (OUTPATIENT)
Age: 37
End: 2022-02-15

## 2022-02-24 ENCOUNTER — APPOINTMENT (OUTPATIENT)
Dept: OBGYN | Facility: CLINIC | Age: 37
End: 2022-02-24
Payer: COMMERCIAL

## 2022-02-24 VITALS
HEIGHT: 62 IN | DIASTOLIC BLOOD PRESSURE: 99 MMHG | WEIGHT: 232 LBS | SYSTOLIC BLOOD PRESSURE: 155 MMHG | HEART RATE: 111 BPM | BODY MASS INDEX: 42.69 KG/M2

## 2022-02-24 PROCEDURE — 99024 POSTOP FOLLOW-UP VISIT: CPT

## 2022-02-28 ENCOUNTER — NON-APPOINTMENT (OUTPATIENT)
Age: 37
End: 2022-02-28

## 2022-03-22 NOTE — PLAN
[FreeTextEntry1] : \par rtw 3/14\par bleeding precautions reviewed\par refill aygestin only prn\par rx pelvic ultraound- 6 month f/u

## 2022-03-22 NOTE — HISTORY OF PRESENT ILLNESS
[de-identified] : feeling better. pain controlled. bleeding a little better. not on aygestin now. thinks period started again. has been bleeding since monday. 4 pads a day.\par \par

## 2022-06-28 ENCOUNTER — APPOINTMENT (OUTPATIENT)
Dept: ULTRASOUND IMAGING | Facility: IMAGING CENTER | Age: 37
End: 2022-06-28

## 2022-06-28 ENCOUNTER — OUTPATIENT (OUTPATIENT)
Dept: OUTPATIENT SERVICES | Facility: HOSPITAL | Age: 37
LOS: 1 days | End: 2022-06-28
Payer: COMMERCIAL

## 2022-06-28 DIAGNOSIS — Z98.890 OTHER SPECIFIED POSTPROCEDURAL STATES: Chronic | ICD-10-CM

## 2022-06-28 DIAGNOSIS — D25.9 LEIOMYOMA OF UTERUS, UNSPECIFIED: ICD-10-CM

## 2022-06-28 PROCEDURE — 76830 TRANSVAGINAL US NON-OB: CPT

## 2022-06-28 PROCEDURE — 76856 US EXAM PELVIC COMPLETE: CPT

## 2022-06-28 PROCEDURE — 76830 TRANSVAGINAL US NON-OB: CPT | Mod: 26

## 2022-06-28 PROCEDURE — 76856 US EXAM PELVIC COMPLETE: CPT | Mod: 26

## 2022-06-30 ENCOUNTER — APPOINTMENT (OUTPATIENT)
Dept: OBGYN | Facility: CLINIC | Age: 37
End: 2022-06-30

## 2022-06-30 VITALS
BODY MASS INDEX: 42.33 KG/M2 | WEIGHT: 230 LBS | HEIGHT: 62 IN | DIASTOLIC BLOOD PRESSURE: 98 MMHG | SYSTOLIC BLOOD PRESSURE: 142 MMHG

## 2022-06-30 DIAGNOSIS — N84.0 POLYP OF CORPUS UTERI: ICD-10-CM

## 2022-06-30 PROCEDURE — 99213 OFFICE O/P EST LOW 20 MIN: CPT

## 2022-07-06 LAB
DHEA-S SERPL-MCNC: 146 UG/DL
ESTIMATED AVERAGE GLUCOSE: 114 MG/DL
ESTRADIOL SERPL-MCNC: 96 PG/ML
FSH SERPL-MCNC: 2.9 IU/L
HBA1C MFR BLD HPLC: 5.6 %
LH SERPL-ACNC: 3.1 IU/L
PROLACTIN SERPL-MCNC: 15.1 NG/ML
TSH SERPL-ACNC: 4.97 UIU/ML

## 2022-07-06 NOTE — HISTORY OF PRESENT ILLNESS
[FreeTextEntry1] : 37 year old female presents for f/u s/p Myomectomy 02/07/22. States that since surgery she skipped a cycle and the following one was prolonged and heavy. Pt had pelvic sono 06/28/22 which stated that there is possibly an endometrial polyp, no fibroids seen. Reports abdominal tenderness above the incision since surgery.

## 2022-07-06 NOTE — PLAN
[FreeTextEntry1] : 37 year old female presents for f/u s/p Myomectomy\par \par -Pt to repeat pelvic sono in 2 months, rx given. discussed options for SHG vs dx hysteroscopy.. \par -Blood drawn: PCOS, TSH and A1C, has been told she has pcos in the past.

## 2022-07-08 ENCOUNTER — NON-APPOINTMENT (OUTPATIENT)
Age: 37
End: 2022-07-08

## 2022-07-08 DIAGNOSIS — E03.9 HYPOTHYROIDISM, UNSPECIFIED: ICD-10-CM

## 2022-07-08 LAB — 17OHP SERPL-MCNC: 14 NG/DL

## 2022-07-08 RX ORDER — LEVOTHYROXINE SODIUM 0.03 MG/1
25 TABLET ORAL DAILY
Qty: 30 | Refills: 1 | Status: ACTIVE | COMMUNITY
Start: 2022-07-08 | End: 1900-01-01

## 2022-08-01 ENCOUNTER — NON-APPOINTMENT (OUTPATIENT)
Age: 37
End: 2022-08-01

## 2022-12-16 ENCOUNTER — NON-APPOINTMENT (OUTPATIENT)
Age: 37
End: 2022-12-16

## 2022-12-16 LAB
% FREE TESTOSTERONE - ESO: 1.1 %
DHEA SERPL-MCNC: 163 NG/DL
FREE TESTOSTERONE - ESO: 2.1 PG/ML
SHBG-ESOTERIX: 30.8 NMOL/L
TESTOSTERONE SERUM - ESO: 19 NG/DL

## 2023-01-19 ENCOUNTER — RX CHANGE (OUTPATIENT)
Age: 38
End: 2023-01-19

## 2023-02-16 ENCOUNTER — NON-APPOINTMENT (OUTPATIENT)
Age: 38
End: 2023-02-16

## 2023-03-02 ENCOUNTER — OUTPATIENT (OUTPATIENT)
Dept: OUTPATIENT SERVICES | Facility: HOSPITAL | Age: 38
LOS: 1 days | End: 2023-03-02
Payer: COMMERCIAL

## 2023-03-02 ENCOUNTER — APPOINTMENT (OUTPATIENT)
Dept: ULTRASOUND IMAGING | Facility: IMAGING CENTER | Age: 38
End: 2023-03-02
Payer: COMMERCIAL

## 2023-03-02 DIAGNOSIS — Z98.890 OTHER SPECIFIED POSTPROCEDURAL STATES: Chronic | ICD-10-CM

## 2023-03-02 DIAGNOSIS — N92.6 IRREGULAR MENSTRUATION, UNSPECIFIED: ICD-10-CM

## 2023-03-02 PROCEDURE — 76830 TRANSVAGINAL US NON-OB: CPT | Mod: 26

## 2023-03-02 PROCEDURE — 76830 TRANSVAGINAL US NON-OB: CPT

## 2023-04-10 ENCOUNTER — LABORATORY RESULT (OUTPATIENT)
Age: 38
End: 2023-04-10

## 2023-04-10 ENCOUNTER — APPOINTMENT (OUTPATIENT)
Dept: OBGYN | Facility: CLINIC | Age: 38
End: 2023-04-10
Payer: COMMERCIAL

## 2023-04-10 VITALS — SYSTOLIC BLOOD PRESSURE: 138 MMHG | WEIGHT: 230 LBS | BODY MASS INDEX: 42.07 KG/M2 | DIASTOLIC BLOOD PRESSURE: 89 MMHG

## 2023-04-10 DIAGNOSIS — N92.6 IRREGULAR MENSTRUATION, UNSPECIFIED: ICD-10-CM

## 2023-04-10 PROCEDURE — 99213 OFFICE O/P EST LOW 20 MIN: CPT

## 2023-04-17 PROBLEM — N92.6 IRREGULAR BLEEDING: Status: ACTIVE | Noted: 2023-02-23

## 2023-04-17 NOTE — HISTORY OF PRESENT ILLNESS
[FreeTextEntry1] : 39 yo female pt present for a follow up appointment. The pt recently started taking an increased Aygestin and was had gotten a pelvic sono 02/23. She is currently on one pill a day, she stopped bleeding on March 6th. She started feeling cramping 4/9/23 but is not currently bleeding. She reports using Aygestin while she was on her period, and stopped the medication when her period ended. After one day of not taking the pill, she experienced more bleeding and decided to continuously take the pill. \par \par In the past the patient had 7 fibroids removed. The pt is currently curious as to whether or not she would be able to get pregnant. She reports not having been "trying" to get pregnant, but does admit to have unprotected sex. The pt is considering getting pregnant. She is considering egg freezing currently. \par \par Pelvic Sono Findings:\par uterus: 8.9 cm x 5/1 cm x 5.6 cm. Again noted is prominence of the posterior myometrium suggestive of adenomyosis. No focal lesions are identified. \par endometrium: 2mm. Within Normal limits.\par \par Right ovary: 4.3 cm x 1.8 cm x 2.8 cm. Within normal limits. Multiple ovarian folliciles.\par Left ovary: 2.2 cm x 2.6 cm x 2.1 cm. Within normal limits. Multiple ovarian follicies.\par \par Fluid: none.\par Impression:\par prominence of posterior myometrium suggestive of adenomyosis. No uterine myomas identified.\par \par Thin endometrial lining. \par

## 2023-04-17 NOTE — PLAN
[FreeTextEntry1] : 39 yo female pt following up Pelvic US\par \par Adenomyosis\par -discussed medical management options: hormones, IUD, Hysterectomy. The pt would like to try medication rather than have a hysterectomy currently\par -reach out to  \par -rx refill Aygestin (start with onset of period)\par -recommends seeing a fertility specialist \par -f/u via telehealth 6 weeks\par \par Thyroid\par -Blood work drawn today (TFT's)\par -f/u results prior to refill of Synthroid\par \par

## 2023-06-01 ENCOUNTER — APPOINTMENT (OUTPATIENT)
Dept: OBGYN | Facility: CLINIC | Age: 38
End: 2023-06-01

## 2023-06-01 LAB
T3 SERPL-MCNC: 133 NG/DL
T3RU NFR SERPL: 1.1 TBI
T4 FREE SERPL-MCNC: 1.4 NG/DL
T4 SERPL-MCNC: 8.7 UG/DL
TSH SERPL-ACNC: 2.89 UIU/ML

## 2023-07-07 ENCOUNTER — APPOINTMENT (OUTPATIENT)
Dept: HUMAN REPRODUCTION | Facility: CLINIC | Age: 38
End: 2023-07-07

## 2023-07-14 DIAGNOSIS — N92.0 EXCESSIVE AND FREQUENT MENSTRUATION WITH REGULAR CYCLE: ICD-10-CM

## 2023-07-14 RX ORDER — NORETHINDRONE ACETATE 5 MG/1
5 TABLET ORAL
Qty: 60 | Refills: 3 | Status: ACTIVE | COMMUNITY
Start: 2022-01-18 | End: 1900-01-01

## 2023-08-13 ENCOUNTER — APPOINTMENT (OUTPATIENT)
Dept: ULTRASOUND IMAGING | Facility: IMAGING CENTER | Age: 38
End: 2023-08-13
Payer: COMMERCIAL

## 2023-08-13 ENCOUNTER — OUTPATIENT (OUTPATIENT)
Dept: OUTPATIENT SERVICES | Facility: HOSPITAL | Age: 38
LOS: 1 days | End: 2023-08-13
Payer: COMMERCIAL

## 2023-08-13 DIAGNOSIS — Z98.890 OTHER SPECIFIED POSTPROCEDURAL STATES: Chronic | ICD-10-CM

## 2023-08-13 DIAGNOSIS — N92.0 EXCESSIVE AND FREQUENT MENSTRUATION WITH REGULAR CYCLE: ICD-10-CM

## 2023-08-13 PROCEDURE — 76830 TRANSVAGINAL US NON-OB: CPT

## 2023-08-13 PROCEDURE — 76856 US EXAM PELVIC COMPLETE: CPT

## 2023-08-13 PROCEDURE — 76856 US EXAM PELVIC COMPLETE: CPT | Mod: 26

## 2023-08-13 PROCEDURE — 76830 TRANSVAGINAL US NON-OB: CPT | Mod: 26

## 2023-10-06 ENCOUNTER — APPOINTMENT (OUTPATIENT)
Dept: OBGYN | Facility: CLINIC | Age: 38
End: 2023-10-06
Payer: COMMERCIAL

## 2023-10-06 VITALS
HEART RATE: 106 BPM | DIASTOLIC BLOOD PRESSURE: 84 MMHG | SYSTOLIC BLOOD PRESSURE: 160 MMHG | OXYGEN SATURATION: 99 % | WEIGHT: 235 LBS | BODY MASS INDEX: 43.24 KG/M2 | HEIGHT: 62 IN

## 2023-10-06 DIAGNOSIS — N80.03 ADENOMYOSIS OF THE UTERUS: ICD-10-CM

## 2023-10-06 PROCEDURE — 99213 OFFICE O/P EST LOW 20 MIN: CPT

## 2023-10-09 ENCOUNTER — ASOB RESULT (OUTPATIENT)
Age: 38
End: 2023-10-09

## 2023-10-09 ENCOUNTER — APPOINTMENT (OUTPATIENT)
Dept: OBGYN | Facility: CLINIC | Age: 38
End: 2023-10-09
Payer: COMMERCIAL

## 2023-10-09 VITALS
DIASTOLIC BLOOD PRESSURE: 103 MMHG | BODY MASS INDEX: 43.61 KG/M2 | SYSTOLIC BLOOD PRESSURE: 143 MMHG | WEIGHT: 237 LBS | HEIGHT: 62 IN

## 2023-10-09 PROCEDURE — 58340 CATHETER FOR HYSTEROGRAPHY: CPT

## 2023-10-09 PROCEDURE — 76831 ECHO EXAM UTERUS: CPT

## 2023-11-30 ENCOUNTER — APPOINTMENT (OUTPATIENT)
Dept: OBGYN | Facility: CLINIC | Age: 38
End: 2023-11-30
Payer: COMMERCIAL

## 2023-11-30 VITALS
BODY MASS INDEX: 42.88 KG/M2 | SYSTOLIC BLOOD PRESSURE: 151 MMHG | HEIGHT: 62 IN | DIASTOLIC BLOOD PRESSURE: 98 MMHG | WEIGHT: 233 LBS

## 2023-11-30 PROCEDURE — 58300 INSERT INTRAUTERINE DEVICE: CPT

## 2024-01-08 ENCOUNTER — APPOINTMENT (OUTPATIENT)
Dept: OBGYN | Facility: CLINIC | Age: 39
End: 2024-01-08
Payer: COMMERCIAL

## 2024-01-08 ENCOUNTER — LABORATORY RESULT (OUTPATIENT)
Age: 39
End: 2024-01-08

## 2024-01-08 VITALS
BODY MASS INDEX: 41.41 KG/M2 | SYSTOLIC BLOOD PRESSURE: 156 MMHG | HEIGHT: 62 IN | DIASTOLIC BLOOD PRESSURE: 100 MMHG | WEIGHT: 225 LBS

## 2024-01-08 DIAGNOSIS — Z01.419 ENCOUNTER FOR GYNECOLOGICAL EXAMINATION (GENERAL) (ROUTINE) W/OUT ABNORMAL FINDINGS: ICD-10-CM

## 2024-01-08 DIAGNOSIS — Z11.51 ENCOUNTER FOR SCREENING FOR HUMAN PAPILLOMAVIRUS (HPV): ICD-10-CM

## 2024-01-08 DIAGNOSIS — Z11.3 ENCOUNTER FOR SCREENING FOR INFECTIONS WITH A PREDOMINANTLY SEXUAL MODE OF TRANSMISSION: ICD-10-CM

## 2024-01-08 PROCEDURE — 99213 OFFICE O/P EST LOW 20 MIN: CPT

## 2024-03-02 ENCOUNTER — NON-APPOINTMENT (OUTPATIENT)
Age: 39
End: 2024-03-02

## 2024-05-06 LAB
C TRACH RRNA SPEC QL NAA+PROBE: NOT DETECTED
HPV HIGH+LOW RISK DNA PNL CVX: DETECTED
N GONORRHOEA RRNA SPEC QL NAA+PROBE: NOT DETECTED
SOURCE TP AMPLIFICATION: NORMAL

## (undated) DEVICE — SUT POLYSORB 4-0 30" SC-2 UNDYED

## (undated) DEVICE — SOL IRR POUR H2O 250ML

## (undated) DEVICE — PREP CHLORAPREP HI-LITE ORANGE 26ML

## (undated) DEVICE — SUT VLOC 180 0 18" GS-21 GREEN

## (undated) DEVICE — VISITEC 4X4

## (undated) DEVICE — GLV 8 PROTEXIS (WHITE)

## (undated) DEVICE — SUT VLOC 180 0 9" GS-21 GREEN

## (undated) DEVICE — UTERINE MANIPULATOR CLINICAL INNOVATIONS CLEARVIEW 7CM

## (undated) DEVICE — BLADE SCALPEL SAFETYLOCK #15

## (undated) DEVICE — DRAPE TOWEL BLUE 17" X 24"

## (undated) DEVICE — SEALER TISSUE ENSEAL CURVED X1 LG 13MM

## (undated) DEVICE — PREP BETADINE SPONGE STICKS

## (undated) DEVICE — DRAPE LIGHT HANDLE COVER (BLUE)

## (undated) DEVICE — PREP BETADINE KIT

## (undated) DEVICE — SUT POLYSORB 2-0 30" GS-21 UNDYED

## (undated) DEVICE — DRAPE MAYO STAND 30"

## (undated) DEVICE — DRSG STERISTRIPS 0.5 X 4"

## (undated) DEVICE — DRSG OPSITE 13.75 X 4"

## (undated) DEVICE — FOLEY TRAY 16FR LF URINE METER SURESTEP

## (undated) DEVICE — SPECIMEN CONTAINER 100ML

## (undated) DEVICE — GOWN TRIMAX LG

## (undated) DEVICE — Device

## (undated) DEVICE — MEDICATION LABELS W MARKER

## (undated) DEVICE — MARKING PEN W RULER

## (undated) DEVICE — DRAPE INSTRUMENT POUCH 6.75" X 11"

## (undated) DEVICE — SUT POLYSORB 3-0 27" GS-21 UNDYED

## (undated) DEVICE — PACK LITHO ABDOMINAL TIBURON

## (undated) DEVICE — GLV 7 PROTEXIS (WHITE)

## (undated) DEVICE — BLADE SCALPEL SAFETYLOCK #10

## (undated) DEVICE — GLV 7.5 PROTEXIS (WHITE)

## (undated) DEVICE — VENODYNE/SCD SLEEVE CALF LARGE

## (undated) DEVICE — SYR LUER LOK 10CC

## (undated) DEVICE — SOL IRR POUR NS 0.9% 500ML

## (undated) DEVICE — ELCTR BOVIE PENCIL SMOKE EVACUATION

## (undated) DEVICE — UTERINE MANIPULATOR CONMED VCARE MED 34MM

## (undated) DEVICE — PACK MAJOR ABDOMINAL SUPINE

## (undated) DEVICE — UTERINE MANIPULATOR CONMED VCARE LG 37MM

## (undated) DEVICE — NDL HYPO REGULAR BEVEL 22G X 1.5" (TURQUOISE)

## (undated) DEVICE — ABDOMINAL BINDER XL 9" X 62"-74"

## (undated) DEVICE — SUT POLYSORB 0 30" GS-21 UNDYED

## (undated) DEVICE — GLV 8.5 PROTEXIS (WHITE)

## (undated) DEVICE — SUT POLYSORB 2-0 30" V-20 UNDYED

## (undated) DEVICE — POSITIONER FOAM EGG CRATE ULNAR 2PCS (PINK)

## (undated) DEVICE — LAP PAD 18 X 18"

## (undated) DEVICE — UTERINE MANIPULATOR COOPER SURGICAL 5MM 33CM GREEN

## (undated) DEVICE — SYR LUER LOK 20CC

## (undated) DEVICE — SUT POLYSORB 0 36" GS-24 UNDYED

## (undated) DEVICE — UTERINE MANIPULATOR CONMED VCARE SM 32MM

## (undated) DEVICE — STAPLER SKIN VISI-STAT 35 WIDE

## (undated) DEVICE — GLV 6.5 PROTEXIS (WHITE)

## (undated) DEVICE — PREP CHLOROHEXIDINE 4% 118CC KIT

## (undated) DEVICE — FOLEY HOLDER STATLOCK 2 WAY ADULT

## (undated) DEVICE — WARMING BLANKET UPPER ADULT

## (undated) DEVICE — SUT VLOC 180 0 12" GS-21 GREEN